# Patient Record
(demographics unavailable — no encounter records)

---

## 2020-02-12 NOTE — ER DOCUMENT REPORT
HPI





- HPI


Time Seen by Provider: 02/12/20 12:58


Context: 





Patient is a 32-year-old female who presents emergency department with a chief 

complaint of urinary symptoms.  Patient reports for about 3 days she is had 

urinary frequency, bladder pain and feeling that she cannot fully empty her 

bladder.  Patient denies fever, nausea, vomiting or diarrhea.  Patient denies 

vaginal discharge.  Patient reports she does have a history of a full 

hysterectomy.  





- REPRODUCTIVE


Reproductive: DENIES: Pregnant:





Past Medical History





- General


Information source: Patient





- Social History


Smoking Status: Unknown if Ever Smoked


Lives with: Family


Family History: Reviewed & Not Pertinent





- Past Medical History


Cardiac Medical History: Reports: None


   Denies: Hx Coronary Artery Disease, Hx Heart Attack, Hx Hypertension


Pulmonary Medical History: Reports: None


   Denies: Hx Asthma, Hx Bronchitis, Hx COPD, Hx Pneumonia


EENT Medical History: Reports: None


Neurological Medical History: Reports: None.  Denies: Hx Cerebrovascular 

Accident, Hx Seizures


Endocrine Medical History: Reports: None


Renal/ Medical History: Reports: None


Malignancy Medical History: Reports: None


GI Medical History: Reports: Hx Diverticulitis, Hx Gastroesophageal Reflux 

Disease.  Denies: Hx Hepatitis, Hx Hiatal Hernia, Hx Ulcer


Musculoskeletal Medical History: Reports None, Denies Hx Arthritis


Skin Medical History: Reports None


Psychiatric Medical History: Reports: Hx Anxiety


Traumatic Medical History: Reports: None


Infectious Medical History: Reports: None.  Denies: Hx Hepatitis


Past Surgical History: Reports: Hx Hysterectomy - Partial lap-3/9/12-endome.  

Denies: Hx Mastectomy, Hx Open Heart Surgery, Hx Pacemaker





- Immunizations


Immunizations up to date: No


Hx Diphtheria, Pertussis, Tetanus Vaccination: No





Vertical Provider Document





- CONSTITUTIONAL


Agree With Documented VS: Yes


Exam Limitations: No Limitations


General Appearance: No Apparent Distress





- INFECTION CONTROL


TRAVEL OUTSIDE OF THE [place] IN LAST 30 DAYS: No





- HEENT


HEENT: Atraumatic, Normal ENT Exam, Normocephalic, PERRLA





- NECK


Neck: Normal Inspection





- RESPIRATORY


Respiratory: Breath Sounds Normal, No Respiratory Distress





- CARDIOVASCULAR


Cardiovascular: Regular Rate, Regular Rhythm





- GI/ABDOMEN


Gastrointestinal: Abdomen Soft, Normal Bowel Sounds


Notes: 





Mild suprapubic tenderness.





- MUSCULOSKELETAL/EXTREMETIES


Musculoskeletal/Extremeties: FROM





- NEURO


Level of Consciousness: Awake, Alert, Appropriate





- DERM


Integumentary: Warm, Dry, No Rash





Course





- Re-evaluation


Re-evalutation: 





02/12/20 14:16


Patient's urinalysis was unremarkable.  Upon reevaluation patient reports that 

she is having increased urinary frequency over the past 3 days.  Patient reports

this does feel like her normal urinary tract infections.  We will send off a 

culture.  Patient reports last time she was sexually active was at the beginning

of January.  I did offer the patient a pelvic exam to rule out yeast or 

bacterial vaginosis as this can also cause urinary symptoms.  Patient denies 

vaginal discharge.  Patient reports that due to her hysterectomy 9 years ago she

is on medication that she inserts vaginally due to vaginal dryness.  Patient 

reports the last time she used this medication was yesterday.


02/12/20 15:19


I was brought back to the room as the nurse tech attempted to set up the pelvic 

and the patient is now refusing.  I did inform the patient that certain vaginal 

infections can lead to urinary symptoms.  Patient's urinalysis was unremarkable.

 Patient states she now she would get the pelvic examination.  I have sent off a

culture.  Blood sugar in the 90's.  


02/12/20 17:37


I time of discharge patient was not tachycardic, hypotensive or febrile.





- Vital Signs


Vital signs: 


                                        











Temp Pulse Resp BP Pulse Ox


 


 98.5 F   68   18   126/65 H  100 


 


 02/12/20 12:37  02/12/20 12:37  02/12/20 12:37  02/12/20 12:37  02/12/20 12:37














- Laboratory


Laboratory results interpreted by me: 





02/12/20 16:12


Laboratory











  02/12/20 02/12/20 02/12/20





  12:52 15:19 15:34


 


POC Glucose   94 


 


Urine Color  YELLOW  


 


Urine Appearance  SLIGHTLY-CLOUDY  


 


Urine pH  5.0  


 


Ur Specific Gravity  1.020  


 


Urine Protein  NEGATIVE  


 


Urine Glucose (UA)  NEGATIVE  


 


Urine Ketones  NEGATIVE  


 


Urine Blood  NEGATIVE  


 


Urine Nitrite (Reflex)  NEGATIVE  


 


Urine Bilirubin  NEGATIVE  


 


Urine Urobilinogen  NEGATIVE  


 


Leukocyte Esterase Rfl  NEGATIVE  


 


Urine RBC (Auto)  1  


 


U Hyaline Cast (Auto)  1  


 


Urine WBC (Reflex)  2  


 


Squamous Epi Cells Auto  13  


 


Urine Mucus (Auto)  OCC  


 


Urine Ascorbic Acid  NEGATIVE  


 


Trichomonas (Wet Prep)    NO TRICHOMONAS SEEN


 


Vaginal WBC    RARE WBCS SEEN


 


Vaginal Yeast    NO YEAST SEEN














Procedures





- Pelvic Exam


  ** Pelvic exam


Time completed: 15:00 - 0


Cultures obtained: Yes


Wet prep obtained: Yes


Witnessed by: SHARON PCT


Notes: 





02/12/20 15:35


Patient's external genitalia was unremarkable without edema, lesions, erythema. 

Patient tolerated the insertion of the speculum well.  Extra lubrication was 

used due to the patient's request as she does judge with vaginal dryness after 

her hysterectomy.  There is no significant vaginal discharge.  No vaginal 

bleeding.  Patient has had a hysterectomy.





Discharge





- Discharge


Clinical Impression: 


 Dysuria, Urinary frequency





Condition: Stable


Disposition: HOME, SELF-CARE


Additional Instructions: 


*Today you are seen the emergency department for urinary frequency.  Urinalysis 

was unremarkable.  I did send off a urine culture which will result in the next 

48 to 72 hours.  You will be contacted if you require an antibiotic.  Your 

vaginal specimens did not show a bacterial vaginosis or yeast.  Gonorrhea and 

Chlamydia cultures are pending at this time.  Please return emergency department

if you develop fever, kidney pain, vomiting, abdominal pain or any new or 

worsening symptoms.








Please continue to push fluids to stay hydrated.


Referrals: 


[facility] [place] [Provider Group] - Follow up as needed


[facility] [Provider Group] - Follow up as needed